# Patient Record
Sex: FEMALE | Race: WHITE | NOT HISPANIC OR LATINO | Employment: UNEMPLOYED | URBAN - NONMETROPOLITAN AREA
[De-identification: names, ages, dates, MRNs, and addresses within clinical notes are randomized per-mention and may not be internally consistent; named-entity substitution may affect disease eponyms.]

---

## 2018-01-16 ENCOUNTER — TELEMEDICINE2 (OUTPATIENT)
Dept: MEDICAL GROUP | Facility: PHYSICIAN GROUP | Age: 38
End: 2018-01-16
Payer: MEDICAID

## 2018-01-16 ENCOUNTER — TELEMEDICINE ORIGINATING SITE VISIT (OUTPATIENT)
Dept: MEDICAL GROUP | Facility: CLINIC | Age: 38
End: 2018-01-16
Payer: MEDICAID

## 2018-01-16 VITALS
OXYGEN SATURATION: 99 % | BODY MASS INDEX: 31.07 KG/M2 | DIASTOLIC BLOOD PRESSURE: 88 MMHG | TEMPERATURE: 98.8 F | WEIGHT: 182 LBS | HEIGHT: 64 IN | RESPIRATION RATE: 16 BRPM | SYSTOLIC BLOOD PRESSURE: 131 MMHG | HEART RATE: 84 BPM

## 2018-01-16 DIAGNOSIS — J01.01 ACUTE RECURRENT MAXILLARY SINUSITIS: ICD-10-CM

## 2018-01-16 PROCEDURE — 99203 OFFICE O/P NEW LOW 30 MIN: CPT | Performed by: NURSE PRACTITIONER

## 2018-01-16 RX ORDER — AMOXICILLIN AND CLAVULANATE POTASSIUM 875; 125 MG/1; MG/1
1 TABLET, FILM COATED ORAL 2 TIMES DAILY
Qty: 14 TAB | Refills: 0 | Status: SHIPPED | OUTPATIENT
Start: 2018-01-16 | End: 2019-03-11

## 2018-01-16 ASSESSMENT — PATIENT HEALTH QUESTIONNAIRE - PHQ9: CLINICAL INTERPRETATION OF PHQ2 SCORE: 0

## 2018-01-16 NOTE — ASSESSMENT & PLAN NOTE
Patient reports chronic sinus symptoms that include infection. The infections started as a teenager. Patient was evaluated with CT and was told she would be candidate

## 2018-01-16 NOTE — PROGRESS NOTES
Chief Complaint   Patient presents with   • Sinus Problem   • Other     lab orders       HISTORY OF PRESENT ILLNESS: Patient is a 37 y.o. female [facility]  Patient, who presents today to discuss:   Verified Identification with patient.  Secured video conference with RN presenter in [place], [place]        Acute recurrent maxillary sinusitis  Patient reports chronic sinus symptoms that include infection. The infections started as a teenager. Patient was evaluated with CT and was told she would be candidate for surgery, patient was scheduled for surgery but found her sinuses to be healed and the surgery was postponed. Patient states that she has recurrent sinusitis usually is once maybe twice a year. Antibiotic fully relieves the pressure, pain, and drainage. She is here for that evaluation.  Most recently she was sick in bed about 2 weeks ago until now she has severe pressure on her cheek bones postnasal drip and generalized malaise with halitosis and a bad taste in her mouth.    BMI 31.0-31.9,adult  Patient walks and clean houses.  Standard american diet. Patient has not ever gone on a diet. Patient is currently seeing a health nurse and had a TSH and prolactin drawn.  Allergies:Sulfa drugs    Current Outpatient Prescriptions Ordered in [facility]   Medication Sig Dispense Refill   • amoxicillin-clavulanate (AUGMENTIN) 875-125 MG Tab Take 1 Tab by mouth 2 times a day. 14 Tab 0     No current Epic-ordered facility-administered medications on file.        History reviewed. No pertinent past medical history.    Social History   Substance Use Topics   • Smoking status: Never Smoker   • Smokeless tobacco: Never Used   • Alcohol use 1.2 oz/week     2 Cans of beer per week       Family Status   Relation Status   • Mother Alive   • Father    • Sister Alive   • Brother Alive   • Sister Alive   • Brother Alive   History reviewed. No pertinent family history.    ROS: As documented in my HPI      Exam:  Blood pressure  "131/88, pulse 84, temperature 37.1 °C (98.8 °F), resp. rate 16, height 1.626 m (5' 4\"), weight 82.6 kg (182 lb), SpO2 99 %.  General:  Well nourished, well developed female in NAD  Face tender at cheek bones and nasal folds patient states it feels \"bruise\":   HEENT: Eyes conjunctiva is clear, lids without ptosis, pupils equal round and reactive to light and accommodation.  Ears normal shape and contour, canals are clear bilaterally, TMs with good light reflex right ear is distorted and yellowed Nasal mucosa pale and edematous with clear rhinorrhea.  Oropharynx benign.  Sinuses (frontal and maxillary) nontender to palpation.  Neck: Supple.   Thyroid: symmetric   Pulmonary:  Normal effort. No rales, ronchi, or wheezing.  Cardiovascular: Regular rate and rhythm without murmur.   Extremities: no clubbing, cyanosis, or edema.  Psych: Alert and oriented x3. Normal mood and affect.   SKIN: Clearno lesions  Neurological: No focal deficits      Please note that this dictation was created using voice recognition software. I have made every reasonable attempt to correct obvious errors, but I expect that there are errors of grammar and possibly content that I did not discover before finalizing the note.    Assessment/Plan:  1. BMI 31.0-31.9,adult  Patient identified as having weight management issue.  Appropriate orders and counseling given.   2. Acute recurrent maxillary sinusitis  amoxicillin-clavulanate (AUGMENTIN) 875-125 MG Tab   Patient is normal saline rinse for Mona pot as described. Return at a later date for other labs which will include fasting chemistry, lipid, vitamin D or other necessary labs otherwise follow-up with the health nurse.         "

## 2018-01-17 ENCOUNTER — TELEPHONE (OUTPATIENT)
Dept: MEDICAL GROUP | Facility: PHYSICIAN GROUP | Age: 38
End: 2018-01-17

## 2019-03-11 ENCOUNTER — NON-PROVIDER VISIT (OUTPATIENT)
Dept: MEDICAL GROUP | Facility: CLINIC | Age: 39
End: 2019-03-11
Payer: MEDICAID

## 2019-03-11 ENCOUNTER — TELEMEDICINE2 (OUTPATIENT)
Dept: URGENT CARE | Facility: CLINIC | Age: 39
End: 2019-03-11
Payer: MEDICAID

## 2019-03-11 ENCOUNTER — TELEMEDICINE ORIGINATING SITE VISIT (OUTPATIENT)
Dept: MEDICAL GROUP | Facility: CLINIC | Age: 39
End: 2019-03-11

## 2019-03-11 VITALS
HEART RATE: 105 BPM | DIASTOLIC BLOOD PRESSURE: 85 MMHG | OXYGEN SATURATION: 99 % | WEIGHT: 184 LBS | SYSTOLIC BLOOD PRESSURE: 124 MMHG | BODY MASS INDEX: 31.41 KG/M2 | HEIGHT: 64 IN | TEMPERATURE: 99.3 F

## 2019-03-11 DIAGNOSIS — R31.9 URINARY TRACT INFECTION WITH HEMATURIA, SITE UNSPECIFIED: ICD-10-CM

## 2019-03-11 DIAGNOSIS — R10.9 ABDOMINAL DISCOMFORT: ICD-10-CM

## 2019-03-11 DIAGNOSIS — R10.9 FLANK PAIN: ICD-10-CM

## 2019-03-11 DIAGNOSIS — R30.0 DYSURIA: ICD-10-CM

## 2019-03-11 DIAGNOSIS — N39.0 URINARY TRACT INFECTION WITH HEMATURIA, SITE UNSPECIFIED: ICD-10-CM

## 2019-03-11 LAB
APPEARANCE UR: NORMAL
BILIRUB UR STRIP-MCNC: NEGATIVE MG/DL
COLOR UR AUTO: NORMAL
GLUCOSE UR STRIP.AUTO-MCNC: NEGATIVE MG/DL
KETONES UR STRIP.AUTO-MCNC: NEGATIVE MG/DL
LEUKOCYTE ESTERASE UR QL STRIP.AUTO: NORMAL
NITRITE UR QL STRIP.AUTO: POSITIVE
PH UR STRIP.AUTO: 6 [PH] (ref 5–8)
PROT UR QL STRIP: 100 MG/DL
RBC UR QL AUTO: NORMAL
SP GR UR STRIP.AUTO: 1.02
UROBILINOGEN UR STRIP-MCNC: 1 MG/DL

## 2019-03-11 PROCEDURE — 99999 PR NO CHARGE: CPT | Performed by: PHYSICIAN ASSISTANT

## 2019-03-11 PROCEDURE — 81002 URINALYSIS NONAUTO W/O SCOPE: CPT | Performed by: NURSE PRACTITIONER

## 2019-03-11 PROCEDURE — 99214 OFFICE O/P EST MOD 30 MIN: CPT | Performed by: NURSE PRACTITIONER

## 2019-03-11 PROCEDURE — 96372 THER/PROPH/DIAG INJ SC/IM: CPT | Performed by: PHYSICIAN ASSISTANT

## 2019-03-11 RX ORDER — KETOROLAC TROMETHAMINE 30 MG/ML
30 INJECTION, SOLUTION INTRAMUSCULAR; INTRAVENOUS ONCE
Status: COMPLETED | OUTPATIENT
Start: 2019-03-11 | End: 2019-03-11

## 2019-03-11 RX ORDER — CIPROFLOXACIN 500 MG/1
500 TABLET, FILM COATED ORAL 2 TIMES DAILY
Qty: 14 TAB | Refills: 0 | Status: SHIPPED | OUTPATIENT
Start: 2019-03-11 | End: 2019-03-18

## 2019-03-11 RX ADMIN — KETOROLAC TROMETHAMINE 30 MG: 30 INJECTION, SOLUTION INTRAMUSCULAR; INTRAVENOUS at 12:23

## 2019-03-11 ASSESSMENT — ENCOUNTER SYMPTOMS
CHILLS: 1
MYALGIAS: 1
BACK PAIN: 1
ABDOMINAL PAIN: 1

## 2019-03-12 NOTE — PROGRESS NOTES
"Subjective:      Suzan Brown is a 38 y.o. female who presents with Other (Lower back pain x1 day)            This is a new problem.  Patient presents to the urgent care in [place] with complaints of back pain, stomach pain and generalized aches for one day.  She reports a history of kidney stones in the past and states the type of discomfort she has today feels similar.  She denies any fevers, nausea, vomiting or diarrhea.  She has not taken any medication for the pain today.  Reports she feels very sick.  She has been drinking fluids today but her appetite is poor.  She admits that nothing seems to make the aches and pains better.        Review of Systems   Constitutional: Positive for chills.   Gastrointestinal: Positive for abdominal pain.   Musculoskeletal: Positive for back pain and myalgias.   All other systems reviewed and are negative.    No past medical history on file.   Past Surgical History:   Procedure Laterality Date   • TONSILLECTOMY        Social History     Social History   • Marital status: Single     Spouse name: N/A   • Number of children: N/A   • Years of education: N/A     Occupational History   • Not on file.     Social History Main Topics   • Smoking status: Never Smoker   • Smokeless tobacco: Never Used   • Alcohol use 1.2 oz/week     2 Cans of beer per week   • Drug use: Yes     Types: Marijuana   • Sexual activity: Yes     Partners: Male     Other Topics Concern   • Not on file     Social History Narrative   • No narrative on file          Objective:     /85 (BP Location: Right arm, Patient Position: Sitting)   Pulse (!) 105   Temp 37.4 °C (99.3 °F) (Temporal)   Ht 1.626 m (5' 4\")   Wt 83.5 kg (184 lb)   SpO2 99%   BMI 31.58 kg/m²      Physical Exam   Constitutional: She is oriented to person, place, and time. Vital signs are normal. She appears well-developed and well-nourished.  Non-toxic appearance.   HENT:   Head: Normocephalic and atraumatic.   Eyes: Pupils are equal, " round, and reactive to light. EOM are normal.   Neck: Normal range of motion.   Cardiovascular: Normal rate and regular rhythm.    Pulmonary/Chest: Effort normal.   Abdominal: Soft. Normal appearance. There is tenderness in the suprapubic area. There is CVA tenderness (right).   Musculoskeletal: Normal range of motion.   Neurological: She is alert and oriented to person, place, and time.   Skin: Skin is warm and dry. Capillary refill takes less than 2 seconds.   Psychiatric: She has a normal mood and affect. Her speech is normal and behavior is normal. Thought content normal.   Vitals reviewed.         Lab Results   Component Value Date/Time    POCCOLOR Cassy 03/11/2019 11:53 AM    POCAPPEAR Cloudy 03/11/2019 11:53 AM    POCLEUKEST Moderate 03/11/2019 11:53 AM    POCNITRITE Positive 03/11/2019 11:53 AM    POCUROBILIGE 1.0 03/11/2019 11:53 AM    POCPROTEIN 100 03/11/2019 11:53 AM    POCURPH 6.0 03/11/2019 11:53 AM    POCBLOOD Moderate 03/11/2019 11:53 AM    POCSPGRV 1.025 03/11/2019 11:53 AM    POCKETONES Negative 03/11/2019 11:53 AM    POCBILIRUBIN Negative 03/11/2019 11:53 AM    POCGLUCUA Negative 03/11/2019 11:53 AM           Assessment/Plan:     1. Abdominal discomfort  - POCT Urinalysis    2. Urinary tract infection with hematuria, site unspecified  - URINE CULTURE(NEW); Future  - cefTRIAXone (ROCEPHIN) 1 g, lidocaine (XYLOCAINE) 1 % 3.6 mL for IM use; 1 g by Intramuscular route Once.  - ketorolac (TORADOL) injection 30 mg; 1 mL by Intramuscular route Once.  - ciprofloxacin (CIPRO) 500 MG Tab; Take 1 Tab by mouth 2 times a day for 7 days.  Dispense: 14 Tab; Refill: 0    3. Flank pain  - cefTRIAXone (ROCEPHIN) 1 g, lidocaine (XYLOCAINE) 1 % 3.6 mL for IM use; 1 g by Intramuscular route Once.  - ketorolac (TORADOL) injection 30 mg; 1 mL by Intramuscular route Once.  - ciprofloxacin (CIPRO) 500 MG Tab; Take 1 Tab by mouth 2 times a day for 7 days.  Dispense: 14 Tab; Refill: 0    Pt tolerated injections well  She  reports improvement of overall discomfort by the time of discharge  Alternate tylenol and ibuprofen as needed for pain   Increase water intake  Will call with cx results if I need to change abx  Strict ER precautions for new onset of fevers, vomiting or worsening back pain  Supportive care, differential diagnoses, and indications for immediate follow-up discussed with patient.    Pathogenesis of diagnosis discussed including typical length and natural progression.      Instructed to return to  or nearest emergency department if symptoms fail to improve, for any change in condition, further concerns, or new concerning symptoms.  Patient states understanding of the plan of care and discharge instructions.

## 2020-01-06 ENCOUNTER — TELEMEDICINE2 (OUTPATIENT)
Dept: URGENT CARE | Facility: CLINIC | Age: 40
End: 2020-01-06
Payer: MEDICAID

## 2020-01-06 VITALS
OXYGEN SATURATION: 97 % | HEART RATE: 76 BPM | WEIGHT: 184 LBS | HEIGHT: 64 IN | SYSTOLIC BLOOD PRESSURE: 140 MMHG | TEMPERATURE: 98.6 F | RESPIRATION RATE: 16 BRPM | DIASTOLIC BLOOD PRESSURE: 80 MMHG | BODY MASS INDEX: 31.41 KG/M2

## 2020-01-06 DIAGNOSIS — M79.604 RIGHT LEG PAIN: ICD-10-CM

## 2020-01-06 PROCEDURE — 99214 OFFICE O/P EST MOD 30 MIN: CPT | Performed by: FAMILY MEDICINE

## 2020-01-06 ASSESSMENT — ENCOUNTER SYMPTOMS
MYALGIAS: 1
SHORTNESS OF BREATH: 0
CHILLS: 0
FEVER: 0
NAUSEA: 0
LEG PAIN: 1
EYE PAIN: 0
VOMITING: 0

## 2020-01-06 NOTE — PROGRESS NOTES
"Subjective:   Suzan Brown is a 39 y.o. female who presents for Leg Pain (R leg pain swelling x 3 days)        39 year old female presents via telemedicine visit [place] with complaint of insidious onset right leg pain for 3 days.  The patient denies traumatic injury to the right leg.  Denies use oral contraceptives, smokes marijuana.  The patient reports being less active and in bed from the leg pain.    Leg Pain   This is a new problem. Episode onset: 3 days. Associated symptoms include myalgias. Pertinent negatives include no chest pain, chills, fever, nausea, rash or vomiting. Exacerbated by: direct pressure. She has tried rest for the symptoms.     Review of Systems   Constitutional: Negative for chills and fever.   Eyes: Negative for pain.   Respiratory: Negative for shortness of breath.    Cardiovascular: Negative for chest pain.   Gastrointestinal: Negative for nausea and vomiting.   Musculoskeletal: Positive for myalgias.   Skin: Negative for rash.     Allergies   Allergen Reactions   • Sulfa Drugs Anaphylaxis      Objective:   /80 (BP Location: Right arm, Patient Position: Sitting, BP Cuff Size: Adult)   Pulse 76   Temp 37 °C (98.6 °F) (Temporal)   Resp 16   Ht 1.626 m (5' 4\")   Wt 83.5 kg (184 lb)   SpO2 97%   BMI 31.58 kg/m²   Physical Exam  Vitals signs and nursing note reviewed.   HENT:      Head: Normocephalic.   Eyes:      Conjunctiva/sclera: Conjunctivae normal.   Cardiovascular:      Rate and Rhythm: Normal rate and regular rhythm.      Pulses: No decreased pulses (Telemedicine nurse reported pulses were difficult to palpate yet foot was not cold).   Pulmonary:      Effort: Pulmonary effort is normal.      Breath sounds: Normal breath sounds.   Musculoskeletal:      Right lower leg: She exhibits tenderness (Reported by patient to palpation) and swelling.      Comments: Equivocal Homans yet patient did experience worse pain with foot plantar flexion and extension on examination "   Neurological:      Mental Status: She is alert.           Assessment/Plan:   1. Right leg pain  - US-EXTREMITY VENOUS LOWER UNILAT RIGHT; Future    Advised the patient for the concern of a deep vein thrombosis and advised emergency department evaluation in the closest emergency department facility.